# Patient Record
Sex: MALE | Race: WHITE | URBAN - METROPOLITAN AREA
[De-identification: names, ages, dates, MRNs, and addresses within clinical notes are randomized per-mention and may not be internally consistent; named-entity substitution may affect disease eponyms.]

---

## 2017-07-05 ENCOUNTER — FOLLOW UP ESTABLISHED (OUTPATIENT)
Dept: URBAN - METROPOLITAN AREA CLINIC 24 | Facility: CLINIC | Age: 67
End: 2017-07-05
Payer: COMMERCIAL

## 2017-07-05 DIAGNOSIS — I10 ESSENTIAL (PRIMARY) HYPERTENSION: ICD-10-CM

## 2017-07-05 DIAGNOSIS — H25.813 COMBINED FORMS OF AGE-RELATED CATARACT, BILATERAL: ICD-10-CM

## 2017-07-05 PROCEDURE — 92014 COMPRE OPH EXAM EST PT 1/>: CPT | Performed by: OPTOMETRIST

## 2017-07-05 PROCEDURE — 92134 CPTRZ OPH DX IMG PST SGM RTA: CPT | Performed by: OPTOMETRIST

## 2017-07-05 PROCEDURE — 92015 DETERMINE REFRACTIVE STATE: CPT | Performed by: OPTOMETRIST

## 2017-07-05 ASSESSMENT — VISUAL ACUITY
OD: 20/30
OS: 20/30

## 2017-07-05 ASSESSMENT — INTRAOCULAR PRESSURE
OS: 13
OD: 13

## 2017-07-05 ASSESSMENT — KERATOMETRY
OS: 45.73
OD: 45.88

## 2017-09-08 ENCOUNTER — FOLLOW UP ESTABLISHED (OUTPATIENT)
Dept: URBAN - METROPOLITAN AREA CLINIC 24 | Facility: CLINIC | Age: 67
End: 2017-09-08
Payer: COMMERCIAL

## 2017-09-08 DIAGNOSIS — H04.123 DRY EYE SYNDROME OF BILATERAL LACRIMAL GLANDS: ICD-10-CM

## 2017-09-08 PROCEDURE — 92014 COMPRE OPH EXAM EST PT 1/>: CPT | Performed by: OPTOMETRIST

## 2017-09-08 ASSESSMENT — VISUAL ACUITY
OS: 20/25
OD: 20/30

## 2017-09-08 ASSESSMENT — INTRAOCULAR PRESSURE
OS: 14
OD: 14

## 2017-09-20 ENCOUNTER — Encounter (OUTPATIENT)
Dept: URBAN - METROPOLITAN AREA CLINIC 10 | Facility: CLINIC | Age: 67
End: 2017-09-20
Payer: COMMERCIAL

## 2017-09-20 DIAGNOSIS — H25.013 CORTICAL AGE-RELATED CATARACT, BILATERAL: ICD-10-CM

## 2017-09-20 DIAGNOSIS — Z01.818 ENCOUNTER FOR OTHER PREPROCEDURAL EXAMINATION: Primary | ICD-10-CM

## 2017-09-20 PROCEDURE — 92025 CPTRIZED CORNEAL TOPOGRAPHY: CPT | Performed by: OPHTHALMOLOGY

## 2017-09-20 PROCEDURE — 99213 OFFICE O/P EST LOW 20 MIN: CPT | Performed by: PHYSICIAN ASSISTANT

## 2017-09-20 RX ORDER — ROSUVASTATIN CALCIUM 20 MG/1
20 MG TABLET, FILM COATED ORAL
Qty: 0 | Refills: 0 | Status: ACTIVE
Start: 2017-09-20

## 2017-09-20 RX ORDER — FAMOTIDINE 20 MG/1
20 MG TABLET, FILM COATED ORAL
Qty: 0 | Refills: 0 | Status: ACTIVE
Start: 2017-09-20

## 2017-09-20 RX ORDER — MULTIVITAMIN WITH IRON
TABLET ORAL
Qty: 0 | Refills: 0 | Status: ACTIVE
Start: 2017-09-20

## 2017-09-25 ENCOUNTER — FOLLOW UP ESTABLISHED (OUTPATIENT)
Dept: URBAN - METROPOLITAN AREA CLINIC 10 | Facility: CLINIC | Age: 67
End: 2017-09-25
Payer: COMMERCIAL

## 2017-09-25 DIAGNOSIS — H25.12 AGE-RELATED NUCLEAR CATARACT, LEFT EYE: ICD-10-CM

## 2017-09-25 DIAGNOSIS — H52.223 REGULAR ASTIGMATISM, BILATERAL: ICD-10-CM

## 2017-09-25 DIAGNOSIS — D31.22 BENIGN NEOPLASM OF LEFT RETINA: ICD-10-CM

## 2017-09-25 DIAGNOSIS — H25.11 AGE-RELATED NUCLEAR CATARACT, RIGHT EYE: Primary | ICD-10-CM

## 2017-09-25 PROCEDURE — 92012 INTRM OPH EXAM EST PATIENT: CPT | Performed by: OPHTHALMOLOGY

## 2017-09-25 ASSESSMENT — INTRAOCULAR PRESSURE
OS: 14
OD: 14

## 2017-10-03 ENCOUNTER — SURGERY (OUTPATIENT)
Dept: URBAN - METROPOLITAN AREA SURGERY 5 | Facility: SURGERY | Age: 67
End: 2017-10-03
Payer: COMMERCIAL

## 2017-10-03 PROCEDURE — 66984 XCAPSL CTRC RMVL W/O ECP: CPT | Performed by: OPHTHALMOLOGY

## 2017-10-04 ENCOUNTER — POST OP (OUTPATIENT)
Dept: URBAN - METROPOLITAN AREA CLINIC 24 | Facility: CLINIC | Age: 67
End: 2017-10-04

## 2017-10-04 PROCEDURE — 99024 POSTOP FOLLOW-UP VISIT: CPT | Performed by: OPTOMETRIST

## 2017-10-04 ASSESSMENT — INTRAOCULAR PRESSURE: OS: 20

## 2017-10-10 ENCOUNTER — POST OP (OUTPATIENT)
Dept: URBAN - METROPOLITAN AREA CLINIC 24 | Facility: CLINIC | Age: 67
End: 2017-10-10

## 2017-10-10 PROCEDURE — 99024 POSTOP FOLLOW-UP VISIT: CPT | Performed by: OPTOMETRIST

## 2017-10-10 ASSESSMENT — VISUAL ACUITY
OS: 20/20
OD: 20/30

## 2017-10-10 ASSESSMENT — INTRAOCULAR PRESSURE
OD: 16
OS: 14

## 2017-10-17 ENCOUNTER — SURGERY (OUTPATIENT)
Dept: URBAN - METROPOLITAN AREA SURGERY 5 | Facility: SURGERY | Age: 67
End: 2017-10-17
Payer: COMMERCIAL

## 2017-10-17 PROCEDURE — 66984 XCAPSL CTRC RMVL W/O ECP: CPT | Performed by: OPHTHALMOLOGY

## 2017-10-18 ENCOUNTER — POST OP (OUTPATIENT)
Dept: URBAN - METROPOLITAN AREA CLINIC 24 | Facility: CLINIC | Age: 67
End: 2017-10-18

## 2017-10-18 DIAGNOSIS — Z96.1 PRESENCE OF INTRAOCULAR LENS: Primary | ICD-10-CM

## 2017-10-18 PROCEDURE — 99024 POSTOP FOLLOW-UP VISIT: CPT | Performed by: OPTOMETRIST

## 2017-10-18 ASSESSMENT — INTRAOCULAR PRESSURE: OD: 25

## 2017-11-13 ENCOUNTER — POST OP (OUTPATIENT)
Dept: URBAN - METROPOLITAN AREA CLINIC 24 | Facility: CLINIC | Age: 67
End: 2017-11-13

## 2017-11-13 PROCEDURE — 92015 DETERMINE REFRACTIVE STATE: CPT | Performed by: OPTOMETRIST

## 2017-11-13 PROCEDURE — 99024 POSTOP FOLLOW-UP VISIT: CPT | Performed by: OPTOMETRIST

## 2017-11-13 ASSESSMENT — INTRAOCULAR PRESSURE
OD: 16
OS: 15

## 2017-11-13 ASSESSMENT — VISUAL ACUITY
OS: 20/20
OD: 20/20

## 2023-04-11 ENCOUNTER — OFFICE VISIT (OUTPATIENT)
Dept: URBAN - METROPOLITAN AREA CLINIC 30 | Facility: CLINIC | Age: 73
End: 2023-04-11
Payer: MEDICARE

## 2023-04-11 DIAGNOSIS — H35.372 PUCKERING OF MACULA, LEFT EYE: ICD-10-CM

## 2023-04-11 DIAGNOSIS — H26.493 OTHER SECONDARY CATARACT, BILATERAL: ICD-10-CM

## 2023-04-11 DIAGNOSIS — E11.9 DIABETES MELLITUS TYPE 2 WITHOUT MENTION OF COMPLICATION: Primary | ICD-10-CM

## 2023-04-11 DIAGNOSIS — H52.4 PRESBYOPIA: ICD-10-CM

## 2023-04-11 DIAGNOSIS — H04.123 DRY EYE SYNDROME OF BILATERAL LACRIMAL GLANDS: ICD-10-CM

## 2023-04-11 DIAGNOSIS — I10 ESSENTIAL (PRIMARY) HYPERTENSION: ICD-10-CM

## 2023-04-11 DIAGNOSIS — H43.393 OTHER VITREOUS OPACITIES, BILATERAL: ICD-10-CM

## 2023-04-11 DIAGNOSIS — Z79.84 LONG TERM (CURRENT) USE OF ORAL ANTIDIABETIC DRUGS: ICD-10-CM

## 2023-04-11 DIAGNOSIS — D31.22 BENIGN NEOPLASM OF LEFT RETINA: ICD-10-CM

## 2023-04-11 PROCEDURE — 92134 CPTRZ OPH DX IMG PST SGM RTA: CPT | Performed by: OPTOMETRIST

## 2023-04-11 PROCEDURE — 92004 COMPRE OPH EXAM NEW PT 1/>: CPT | Performed by: OPTOMETRIST

## 2023-04-11 ASSESSMENT — KERATOMETRY
OS: 46.15
OD: 46.15

## 2023-04-11 ASSESSMENT — VISUAL ACUITY
OD: 20/20
OS: 20/25

## 2023-04-11 ASSESSMENT — INTRAOCULAR PRESSURE
OS: 14
OD: 16
OS: 15

## 2023-04-11 NOTE — IMPRESSION/PLAN
Impression: Puckering of macula, left eye: H35.372. Plan: Trace. Mild. Continue to monitor. No symptoms of metamorphopsia. RTC if symptoms change. See MAC OCT.

## 2023-04-11 NOTE — IMPRESSION/PLAN
Impression: Diabetes mellitus Type 2 without mention of complication: K69.1. Plan: No diabetic retinopathy OU. No Diabetic Macular Edema noted OU. Recommend yearly diabetic eye exam. Emphasized importance of strict BS control, diet, exercise, and BP control to avoid risk of loss of vision. Recommend regular, ongoing follow-ups with their PCP to monitor DM as well. Most recent A1C  6.8, per pt.

## 2023-04-11 NOTE — IMPRESSION/PLAN
Impression: Benign neoplasm of left retina Plan: SMALL FLAT NEVUS OS. Stable. No change in size or other suspicious features noted. CTM. HX OF MELANOMA REMOVED FROM FACE.